# Patient Record
Sex: MALE | Race: WHITE | NOT HISPANIC OR LATINO | URBAN - METROPOLITAN AREA
[De-identification: names, ages, dates, MRNs, and addresses within clinical notes are randomized per-mention and may not be internally consistent; named-entity substitution may affect disease eponyms.]

---

## 2017-03-03 ENCOUNTER — APPOINTMENT (OUTPATIENT)
Age: 52
Setting detail: DERMATOLOGY
End: 2017-03-08

## 2017-03-03 DIAGNOSIS — L57.0 ACTINIC KERATOSIS: ICD-10-CM

## 2017-03-03 PROCEDURE — OTHER MIPS QUALITY: OTHER

## 2017-03-03 PROCEDURE — 99202 OFFICE O/P NEW SF 15 MIN: CPT

## 2017-03-03 PROCEDURE — OTHER COUNSELING: OTHER

## 2017-03-03 ASSESSMENT — TOTAL NUMBER OF LESIONS: # OF LESIONS?: 1

## 2017-03-03 ASSESSMENT — LOCATION DETAILED DESCRIPTION DERM: LOCATION DETAILED: RIGHT SUPERIOR PARIETAL SCALP

## 2017-03-03 ASSESSMENT — LOCATION ZONE DERM: LOCATION ZONE: SCALP

## 2017-03-03 ASSESSMENT — LOCATION SIMPLE DESCRIPTION DERM: LOCATION SIMPLE: SCALP

## 2017-03-03 NOTE — PROCEDURE: MIPS QUALITY
Quality 226: Preventive Care And Screening: Tobacco Use: Screening And Cessation Intervention: Patient screened for tobacco and never smoked
Quality 154 Part B: Falls: Risk Screening (Should Be Reported With Measure 155.): Patient screened for future fall risk; documentation of no falls in the past year or only one fall without injury in the past year
Detail Level: Detailed
Quality 431: Preventive Care And Screening: Unhealthy Alcohol Use - Screening: Patient screened for unhealthy alcohol use using a single question and scores less than 2 times per year
Quality 154 Part A: Falls: Risk Assessment (Should Be Reported With Measure 155.): Falls risk assessment completed and documented in the past 12 months.
Quality 110: Preventive Care And Screening: Influenza Immunization: Influenza immunization was not ordered or administered, reason not given